# Patient Record
Sex: MALE | Employment: UNEMPLOYED | URBAN - METROPOLITAN AREA
[De-identification: names, ages, dates, MRNs, and addresses within clinical notes are randomized per-mention and may not be internally consistent; named-entity substitution may affect disease eponyms.]

---

## 2020-01-02 ENCOUNTER — HOSPITAL ENCOUNTER (EMERGENCY)
Facility: CLINIC | Age: 35
Discharge: HOME | End: 2020-01-02
Attending: EMERGENCY MEDICINE | Admitting: EMERGENCY MEDICINE

## 2020-01-02 VITALS
OXYGEN SATURATION: 99 % | TEMPERATURE: 97.3 F | DIASTOLIC BLOOD PRESSURE: 65 MMHG | RESPIRATION RATE: 16 BRPM | SYSTOLIC BLOOD PRESSURE: 111 MMHG | WEIGHT: 150 LBS | HEART RATE: 82 BPM

## 2020-01-02 DIAGNOSIS — T50.901A ACCIDENTAL DRUG OVERDOSE, INITIAL ENCOUNTER: ICD-10-CM

## 2020-01-02 DIAGNOSIS — F15.10 METHAMPHETAMINE ABUSE (H): ICD-10-CM

## 2020-01-02 DIAGNOSIS — F11.10 HEROIN ABUSE (H): ICD-10-CM

## 2020-01-02 DIAGNOSIS — R82.81 PYURIA: ICD-10-CM

## 2020-01-02 LAB
ALBUMIN UR-MCNC: 20 MG/DL
AMORPH CRY #/AREA URNS HPF: ABNORMAL /HPF
AMPHETAMINES UR QL SCN: POSITIVE
ANION GAP SERPL CALCULATED.3IONS-SCNC: 2 MMOL/L (ref 3–14)
APPEARANCE UR: CLEAR
BARBITURATES UR QL: NEGATIVE
BASOPHILS # BLD AUTO: 0 10E9/L (ref 0–0.2)
BASOPHILS NFR BLD AUTO: 0.5 %
BENZODIAZ UR QL: NEGATIVE
BILIRUB UR QL STRIP: NEGATIVE
BUN SERPL-MCNC: 12 MG/DL (ref 7–30)
CALCIUM SERPL-MCNC: 8.9 MG/DL (ref 8.5–10.1)
CANNABINOIDS UR QL SCN: NEGATIVE
CAOX CRY #/AREA URNS HPF: ABNORMAL /HPF
CHLORIDE SERPL-SCNC: 106 MMOL/L (ref 94–109)
CO2 SERPL-SCNC: 31 MMOL/L (ref 20–32)
COCAINE UR QL: NEGATIVE
COLOR UR AUTO: YELLOW
CREAT SERPL-MCNC: 0.65 MG/DL (ref 0.66–1.25)
DIFFERENTIAL METHOD BLD: ABNORMAL
EOSINOPHIL # BLD AUTO: 0.6 10E9/L (ref 0–0.7)
EOSINOPHIL NFR BLD AUTO: 10 %
ERYTHROCYTE [DISTWIDTH] IN BLOOD BY AUTOMATED COUNT: 14.8 % (ref 10–15)
ETHANOL SERPL-MCNC: <0.01 G/DL
GFR SERPL CREATININE-BSD FRML MDRD: >90 ML/MIN/{1.73_M2}
GLUCOSE SERPL-MCNC: 110 MG/DL (ref 70–99)
GLUCOSE UR STRIP-MCNC: NEGATIVE MG/DL
HCT VFR BLD AUTO: 39.2 % (ref 40–53)
HGB BLD-MCNC: 10.8 G/DL (ref 13.3–17.7)
HGB UR QL STRIP: NEGATIVE
IMM GRANULOCYTES # BLD: 0 10E9/L (ref 0–0.4)
IMM GRANULOCYTES NFR BLD: 0.2 %
KETONES UR STRIP-MCNC: NEGATIVE MG/DL
LEUKOCYTE ESTERASE UR QL STRIP: ABNORMAL
LYMPHOCYTES # BLD AUTO: 2.2 10E9/L (ref 0.8–5.3)
LYMPHOCYTES NFR BLD AUTO: 34.8 %
MCH RBC QN AUTO: 21.7 PG (ref 26.5–33)
MCHC RBC AUTO-ENTMCNC: 27.6 G/DL (ref 31.5–36.5)
MCV RBC AUTO: 79 FL (ref 78–100)
MONOCYTES # BLD AUTO: 0.5 10E9/L (ref 0–1.3)
MONOCYTES NFR BLD AUTO: 8.4 %
MUCOUS THREADS #/AREA URNS LPF: PRESENT /LPF
NEUTROPHILS # BLD AUTO: 2.9 10E9/L (ref 1.6–8.3)
NEUTROPHILS NFR BLD AUTO: 46.1 %
NITRATE UR QL: NEGATIVE
NRBC # BLD AUTO: 0 10*3/UL
NRBC BLD AUTO-RTO: 0 /100
OPIATES UR QL SCN: NEGATIVE
PCP UR QL SCN: NEGATIVE
PH UR STRIP: 6 PH (ref 5–7)
PLATELET # BLD AUTO: 329 10E9/L (ref 150–450)
POTASSIUM SERPL-SCNC: 3.4 MMOL/L (ref 3.4–5.3)
RBC # BLD AUTO: 4.98 10E12/L (ref 4.4–5.9)
RBC #/AREA URNS AUTO: 9 /HPF (ref 0–2)
SODIUM SERPL-SCNC: 139 MMOL/L (ref 133–144)
SOURCE: ABNORMAL
SP GR UR STRIP: 1.03 (ref 1–1.03)
UROBILINOGEN UR STRIP-MCNC: NORMAL MG/DL (ref 0–2)
WBC # BLD AUTO: 6.3 10E9/L (ref 4–11)
WBC #/AREA URNS AUTO: 30 /HPF (ref 0–5)

## 2020-01-02 PROCEDURE — 85025 COMPLETE CBC W/AUTO DIFF WBC: CPT | Performed by: EMERGENCY MEDICINE

## 2020-01-02 PROCEDURE — 80307 DRUG TEST PRSMV CHEM ANLYZR: CPT | Performed by: EMERGENCY MEDICINE

## 2020-01-02 PROCEDURE — 25000128 H RX IP 250 OP 636: Performed by: EMERGENCY MEDICINE

## 2020-01-02 PROCEDURE — 81001 URINALYSIS AUTO W/SCOPE: CPT | Performed by: EMERGENCY MEDICINE

## 2020-01-02 PROCEDURE — 87086 URINE CULTURE/COLONY COUNT: CPT | Performed by: EMERGENCY MEDICINE

## 2020-01-02 PROCEDURE — 80048 BASIC METABOLIC PNL TOTAL CA: CPT | Performed by: EMERGENCY MEDICINE

## 2020-01-02 PROCEDURE — 80320 DRUG SCREEN QUANTALCOHOLS: CPT | Performed by: EMERGENCY MEDICINE

## 2020-01-02 PROCEDURE — 99285 EMERGENCY DEPT VISIT HI MDM: CPT | Mod: 25

## 2020-01-02 PROCEDURE — 96374 THER/PROPH/DIAG INJ IV PUSH: CPT

## 2020-01-02 PROCEDURE — 25000128 H RX IP 250 OP 636

## 2020-01-02 RX ORDER — ONDANSETRON HYDROCHLORIDE 4 MG/5ML
4 SOLUTION ORAL ONCE
Status: DISCONTINUED | OUTPATIENT
Start: 2020-01-02 | End: 2020-01-02 | Stop reason: HOSPADM

## 2020-01-02 RX ORDER — ONDANSETRON 2 MG/ML
INJECTION INTRAMUSCULAR; INTRAVENOUS
Status: COMPLETED
Start: 2020-01-02 | End: 2020-01-02

## 2020-01-02 RX ORDER — NALOXONE HYDROCHLORIDE 0.4 MG/ML
0.2 INJECTION, SOLUTION INTRAMUSCULAR; INTRAVENOUS; SUBCUTANEOUS ONCE
Status: COMPLETED | OUTPATIENT
Start: 2020-01-02 | End: 2020-01-02

## 2020-01-02 RX ADMIN — ONDANSETRON HYDROCHLORIDE 4 MG: 2 INJECTION, SOLUTION INTRAMUSCULAR; INTRAVENOUS at 04:37

## 2020-01-02 RX ADMIN — NALXONE HYDROCHLORIDE 0.2 MG: 0.4 INJECTION INTRAMUSCULAR; INTRAVENOUS; SUBCUTANEOUS at 04:38

## 2020-01-02 NOTE — ED NOTES
Bed: ED08  Expected date:   Expected time:   Means of arrival:   Comments:  A598 22M drug overdose

## 2020-01-02 NOTE — ED NOTES
Patient able to provide a urine sample. Patient wakes up to voice, but falls back asleep. Oxygen saturations high 90s, RR 16. Patient given phone to call friend and patient requesting food. Meal tray ordered.

## 2020-01-02 NOTE — ED NOTES
Care Coordinator - Discharge Planning    Admission Date/Time:  1/2/2020  Attending MD:  Yvonne att. providers found     Data  Date of initial CC assessment:  ED assessment 1/2/20  Chart reviewed, discussed with interdisciplinary team.   Patient was admitted for:   1. Accidental drug overdose, initial encounter    2. Heroin abuse (H)    3. Pyuria    4. Methamphetamine abuse (H)         Assessment   Concerns with insurance coverage for discharge needs: pt is homeless; does not share info re: finances  Current Living Situation: Patient is homeless.  Stays with various friends and family in [place].  Knowledgeable about the shelter system.  Declines offer to assist with shelter placement today.    Support System: pt declines to discuss  Services Involved: none identified  Transportation at Discharge: Public transportation  Transportation to Medical Appointments:    - Not applicable  Barriers to Discharge: homeless      Coordination of Care and Referrals: Provided patient/family with options for shelter.   Pt declined.  Accepting of hat, gloves and shirt provided to him, as well as bus tokens and schedules.         Plan  Anticipated Discharge Date:  1/2/20  Anticipated Discharge Plan:  Kali Maldonado RN, LICSW, CCM  RN Care Coordinator  [facility], Emergency Department  Phone  [phone]

## 2020-01-02 NOTE — ED PROVIDER NOTES
History     Chief Complaint:  Drug Overdose    History limited due to acuity of condition and subsequently provided by EMS.   HPI   Kar Chávez is a 34 year old male who presents to the emergency department for evaluation of drug overdose.  Per EMS the patient was found in his car where he had just injected himself with heroin about 15 minutes prior to arrival. En route the patient had a blood pressure of 128/76, heart rate of 82, and SATs at 100% on RA. Patient's history is limited due to acuity of condition.    Allergies:  No Known Drug Allergies      Medications:    The patient is not currently taking any prescribed medications.     Past Medical History:    History reviewed. No pertinent past medical history.     Past Surgical History:    History reviewed. No pertinent past surgical history.     Family History:    History reviewed. No pertinent family history.      Social History:  The patient was accompanied to the ED by EMS.  Drug Use: Heroin     Review of Systems   Unable to perform ROS: Acuity of condition     Physical Exam   First Vitals:  BP: (!) 143/99  Pulse: 80  Heart Rate: 80  Temp: 97.3  F (36.3  C)  Weight: 68 kg (150 lb)  SpO2: 99 %      Physical Exam    General: Patient is alert and interactive when I enter the room  Head:  The scalp, face, and head appear normal  Eyes:  Conjunctivae are normal  ENT:    The nose is normal    Pinnae are normal    External acoustic canals are normal  Neck:  Trachea midline  CV:  Pulses are normal    Resp:  No respiratory distress   Abdomen:      Soft, non-tender, non-distended  Musc:  Normal muscular tone    No major joint effusions    No asymmetric leg swelling    Good capillary refill noted  Skin:  No rash or lesions noted  Neuro:  Speech is normal and fluent. Face is symmetric.     Moving all extremities well.   Psych: Awake. Alert.  Normal affect.  Appropriate interactions.         Emergency Department Course     Laboratory:  Laboratory findings were  communicated with the patient who voiced understanding of the findings.    CBC: WBC 6.3, HGB 10.8 (L),    BMP: Glucose 110 (H), Creatinine 0.65 (L) o/w WNL      Interventions:  0437 Zofran 4mg IV   0438 Narcan 0.2 mg IV     Emergency Department Course:  Nursing notes and vitals reviewed.  0433: I performed an exam of the patient as documented above.    IV was inserted and blood was drawn for laboratory testing, results above.     0601 Patient rechecked and updated.      The patient will be signed out to the oncoming provider.  I personally reviewed the laboratory  results with the Patient and answered all related questions prior to  discharge.     Impression & Plan      Medical Decision Making:    Kar Chávez is a 34 year old male who presents for evaluation of heroin overdose.  This is an intentional overdose and therefore a 72 hour hold was placed.   A broad workup was considered including sequelae of drug overdose (respiratory failure, seizure, arrhythmia, liver or kidney injury, hypotension, metabolic derangement, hypertension, serotonin syndrome, NMS, etc), intentional vs unintentional overdose, volatile alcohol ingestion, ethanol ingestion, encephalitis, meningitis, tylenol or salicylate ingestion, etc.   They are maintaining an airway and vitals are acceptable at this point.  The workup here is negative at this point.  Patient will be signed out to oncoming provider    Diagnosis:  Drug overdose   Disposition:  The patient will be signed out to oncoming provider.     Scribe Disclosure:  I, Aurora Paez, am serving as a scribe at 4:38 AM on 1/2/2020 to document services personally performed by Joanne Schwartz MD based on my observations and the provider's statements to me.    Aurora Paez  1/2/2020   [facility] EMERGENCY DEPARTMENT       Chani Simon MD  01/05/20 9469

## 2020-01-02 NOTE — ED TRIAGE NOTES
Pt found in car with heroin and needle in his arm. Pt rouses to painful stimulus. Arms appear to have heavy IV injection use

## 2020-01-02 NOTE — ED AVS SNAPSHOT
[facility] Emergency Department  [address]  [facility] [address]  Phone:  [phone]  Fax:  [phone]                                    Kar Chávez   MRN: [id number]    Department:  [facility] Emergency Department   Date of Visit:  1/2/2020           After Visit Summary Signature Page    I have received my discharge instructions, and my questions have been answered. I have discussed any challenges I see with this plan with the nurse or doctor.    ..........................................................................................................................................  Patient/Patient Representative Signature      ..........................................................................................................................................  Patient Representative Print Name and Relationship to Patient    ..................................................               ................................................  Date                                   Time    ..........................................................................................................................................  Reviewed by Signature/Title    ...................................................              ..............................................  Date                                               Time          22EPIC Rev 08/18

## 2020-01-02 NOTE — ED PROVIDER NOTES
/65   Pulse 77   Temp 97.3  F (36.3  C) (Temporal)   Resp 12   Wt 68 kg (150 lb)   SpO2 98%     Care coordinator working on bus [employer].  Patient awake.    UDS positive for meth, and patient also using heroin.    Currently normal mental status.    Given clothes and coat.  Does not need a place to stay, wants to travel to friend's house.    Patient admits to heroin and meth usage and does not want treatment.     Chani Simon MD  01/02/20 7121

## 2020-01-02 NOTE — DISCHARGE INSTRUCTIONS
You almost  today because of a heroin overdose.    We resuscitated your breathing and have stabilized you.    Please stop opiates!    Followup with your PCP.

## 2020-01-02 NOTE — ED PROVIDER NOTES
Results for orders placed or performed during the hospital encounter of 01/02/20 (from the past 24 hour(s))   CBC with platelets differential   Result Value Ref Range    WBC 6.3 4.0 - 11.0 10e9/L    RBC Count 4.98 4.4 - 5.9 10e12/L    Hemoglobin 10.8 (L) 13.3 - 17.7 g/dL    Hematocrit 39.2 (L) 40.0 - 53.0 %    MCV 79 78 - 100 fl    MCH 21.7 (L) 26.5 - 33.0 pg    MCHC 27.6 (L) 31.5 - 36.5 g/dL    RDW 14.8 10.0 - 15.0 %    Platelet Count 329 150 - 450 10e9/L    Diff Method Automated Method     % Neutrophils 46.1 %    % Lymphocytes 34.8 %    % Monocytes 8.4 %    % Eosinophils 10.0 %    % Basophils 0.5 %    % Immature Granulocytes 0.2 %    Nucleated RBCs 0 0 /100    Absolute Neutrophil 2.9 1.6 - 8.3 10e9/L    Absolute Lymphocytes 2.2 0.8 - 5.3 10e9/L    Absolute Monocytes 0.5 0.0 - 1.3 10e9/L    Absolute Eosinophils 0.6 0.0 - 0.7 10e9/L    Absolute Basophils 0.0 0.0 - 0.2 10e9/L    Abs Immature Granulocytes 0.0 0 - 0.4 10e9/L    Absolute Nucleated RBC 0.0    Basic metabolic panel   Result Value Ref Range    Sodium 139 133 - 144 mmol/L    Potassium 3.4 3.4 - 5.3 mmol/L    Chloride 106 94 - 109 mmol/L    Carbon Dioxide 31 20 - 32 mmol/L    Anion Gap 2 (L) 3 - 14 mmol/L    Glucose 110 (H) 70 - 99 mg/dL    Urea Nitrogen 12 7 - 30 mg/dL    Creatinine 0.65 (L) 0.66 - 1.25 mg/dL    GFR Estimate >90 >60 mL/min/[1.73_m2]    GFR Estimate If Black >90 >60 mL/min/[1.73_m2]    Calcium 8.9 8.5 - 10.1 mg/dL   Alcohol ethyl   Result Value Ref Range    Ethanol g/dL <0.01 <0.01 g/dL   UA with Microscopic   Result Value Ref Range    Color Urine Yellow     Appearance Urine Clear     Glucose Urine Negative NEG^Negative mg/dL    Bilirubin Urine Negative NEG^Negative    Ketones Urine Negative NEG^Negative mg/dL    Specific Gravity Urine 1.026 1.003 - 1.035    Blood Urine Negative NEG^Negative    pH Urine 6.0 5.0 - 7.0 pH    Protein Albumin Urine 20 (A) NEG^Negative mg/dL    Urobilinogen mg/dL Normal 0.0 - 2.0 mg/dL    Nitrite Urine Negative  NEG^Negative    Leukocyte Esterase Urine Small (A) NEG^Negative    Source Midstream Urine     WBC Urine 30 (H) 0 - 5 /HPF    RBC Urine 9 (H) 0 - 2 /HPF    Mucous Urine Present (A) NEG^Negative /LPF    Calcium Oxalate Few (A) NEG^Negative /HPF    Amorphous Crystals Few (A) NEG^Negative /HPF   Drug abuse screen 77 urine (FL, RH, SH)   Result Value Ref Range    Amphetamine Qual Urine Positive (A) NEG^Negative    Barbiturates Qual Urine Negative NEG^Negative    Benzodiazepine Qual Urine Negative NEG^Negative    Cannabinoids Qual Urine Negative NEG^Negative    Cocaine Qual Urine Negative NEG^Negative    Opiates Qualitative Urine Negative NEG^Negative    PCP Qual Urine Negative NEG^Negative       Urine culture sent.  Care coordinator working on a location (destination) for the patient.     Chani Simon MD  01/02/20 5715

## 2020-01-02 NOTE — ED PROVIDER NOTES
Discharge instructions to the patient:    You almost  today because of a heroin overdose.    We resuscitated your breathing and have stabilized you.    Please stop opiates!    Followup with your PCP.     Chani Simon MD  20 [id number]

## 2020-01-02 NOTE — ED NOTES
Patient discharged to [facility] at 1250. Care coordinator worked with patient to give him gloves, jacket, sweatshirt, hat, socks, and a bus token to get home. Patient agreeable with this. On discharge, patient is alert and oriented, ambulating by himself, and respiratory rate 16.

## 2020-01-03 LAB
BACTERIA SPEC CULT: NO GROWTH
Lab: NORMAL
SPECIMEN SOURCE: NORMAL